# Patient Record
Sex: MALE | Race: WHITE | NOT HISPANIC OR LATINO | Employment: OTHER | URBAN - METROPOLITAN AREA
[De-identification: names, ages, dates, MRNs, and addresses within clinical notes are randomized per-mention and may not be internally consistent; named-entity substitution may affect disease eponyms.]

---

## 2017-05-02 NOTE — PATIENT DISCUSSION
DRY EYES : Discussed with patient the importance of keeping the eye moist and the symptoms associated with dry eyes including blurry vision, tearing, burning, and javed sensation. Tear Lab was performed today to evaluate the severity of dryness. Advised patient to minimize use of any fans blowing directly on the face. Advised patient to continue with over the counter artificial tears 2-3 times daily.

## 2017-05-02 NOTE — PATIENT DISCUSSION
DRY EYES : Discussed with patient the importance of keeping the eye moist and the symptoms associated with dry eyes including blurry vision, tearing, burning, and javed sensation. Advised patient to minimize use of any fans blowing directly on the face. Advised patient to continue with artificial tears 2-3 times daily.

## 2017-07-31 NOTE — PATIENT DISCUSSION
PT NOTES MORE DERMATOCHALASIS OS, PT HAVING TO PULL BROWS UP TO COUNTERACT HOODING AND RIGHT BROW SITS HIGHER THAN LEFT. IF MOVING OS BROW TO SAME HEIGHT AS OD THE PROBLEM RESOLVES. POSSIBLY SYMPTOMS ARE SECONDARY TO ASYMMETRY IN BROW PTOSIS. PT ALREADY ON ANTIBIOTICS AND ALLERGY MEDS THROUGH PCP. FOLLOWUP WITH ERICA TO SEE IF FURTHER TREATEMENT INCIDATED OR SURGICAL CANDIDATE.   DID EDUCATED PT TO USE COOL COMPRESSES UNTIL FOLLOWUP

## 2017-07-31 NOTE — PATIENT DISCUSSION
"DERMATOCHALASIS COUNSELING:  is a medical condition, defined as an excess of skin in the upper or lower eyelid, also known as ""baggy eyes. "" It may be either an acquired or a congenital condition. It is generally treated with blepharoplasty. "

## 2017-08-10 NOTE — PATIENT DISCUSSION
DERMATOCHALASIS OU: VISUALLY SIGNIFICANT AT THIS TIME.  PT WISHES TO FOLLOW WILL CALL WHEN READY TO SCHEDULE

## 2017-08-23 NOTE — PATIENT DISCUSSION
New Prescription: erythromycin (erythromycin): ointment: 5 mg/gram (0.5 %) a small amount twice a day as needed into both eyes 08-

## 2017-09-19 NOTE — PATIENT DISCUSSION
Continue: erythromycin (erythromycin): ointment: 5 mg/gram (0.5 %) a small amount twice a day as needed into both eyes 08-

## 2019-05-14 NOTE — PATIENT DISCUSSION
PT HAS STOPPED TAKING PLAQUENIL MEDICATION. THE MACULA LOOKS GOOD AND NO DAMAGE HAS BEEN DONE.  ONE MORE VF NEEDS TO BE DONE THEN THE PT CAN BE SEEN YEARLY

## 2020-11-03 ENCOUNTER — NEW PATIENT COMPREHENSIVE (OUTPATIENT)
Dept: URBAN - METROPOLITAN AREA CLINIC 32 | Facility: CLINIC | Age: 50
End: 2020-11-03

## 2020-11-03 DIAGNOSIS — H25.13: ICD-10-CM

## 2020-11-03 DIAGNOSIS — H43.313: ICD-10-CM

## 2020-11-03 PROCEDURE — 92004 COMPRE OPH EXAM NEW PT 1/>: CPT

## 2020-11-03 PROCEDURE — 92015 DETERMINE REFRACTIVE STATE: CPT

## 2020-11-03 ASSESSMENT — KERATOMETRY
OD_AXISANGLE2_DEGREES: 179
OD_AXISANGLE_DEGREES: 89
OD_K2POWER_DIOPTERS: 45.25
OD_K1POWER_DIOPTERS: 47.75
OS_AXISANGLE_DEGREES: 92
OS_K1POWER_DIOPTERS: 48
OS_AXISANGLE2_DEGREES: 2
OS_K2POWER_DIOPTERS: 45.75

## 2020-11-03 ASSESSMENT — VISUAL ACUITY
OS_SC: 20/300
OU_SC: J1+-1
OD_SC: J1+-1
OD_CC: 20/20
OS_CC: 20/30-2
OD_SC: 20/300
OS_SC: J1+-1

## 2020-11-03 ASSESSMENT — TONOMETRY
OD_IOP_MMHG: 16
OS_IOP_MMHG: 16

## 2022-05-26 NOTE — PATIENT DISCUSSION
Moderate Dry Eyes: Prescribed disappearing preservative or preservative-free artificial tears 4-6x per day OU and the dailty intake of Omega-3 DHA/EPA fatty acids to help relieve symptoms.  Add nightly lubricating ointment or gel.  Return for follow-up as scheduled or sooner if symptoms persist.

## 2024-02-19 NOTE — PATIENT DISCUSSION
Anti-reflective Body Location Override (Optional - Billing Will Still Be Based On Selected Body Map Location If Applicable): right anterior thigh Detail Level: Simple Size Of Lesion In Cm (Optional): 0.4 X Size Of Lesion In Cm (Optional): 0 Morphology Per Location (Optional): 2-color brown macule Body Location Override (Optional - Billing Will Still Be Based On Selected Body Map Location If Applicable): left anterior thigh Morphology Per Location (Optional): brown macule Body Location Override (Optional - Billing Will Still Be Based On Selected Body Map Location If Applicable): r lat thigh Morphology Per Location (Optional): Brown macule slightly irregular